# Patient Record
Sex: FEMALE | Race: WHITE
[De-identification: names, ages, dates, MRNs, and addresses within clinical notes are randomized per-mention and may not be internally consistent; named-entity substitution may affect disease eponyms.]

---

## 2018-12-27 ENCOUNTER — HOSPITAL ENCOUNTER (OUTPATIENT)
Dept: HOSPITAL 63 - US | Age: 58
Discharge: HOME | End: 2018-12-27
Attending: NURSE PRACTITIONER
Payer: COMMERCIAL

## 2018-12-27 DIAGNOSIS — N88.8: Primary | ICD-10-CM

## 2018-12-27 PROCEDURE — 76830 TRANSVAGINAL US NON-OB: CPT

## 2018-12-27 PROCEDURE — 76856 US EXAM PELVIC COMPLETE: CPT

## 2018-12-27 NOTE — RAD
US PELVIS W/TV:  12/27/2018 8:07 AM

 

INDICATION: 58 years old Female. Pelvic pain for months.

 

COMPARISON: None available.

 

TECHNIQUE: Transabdominal and transvaginal sonographic evaluation of the 

pelvis was performed.  

 

FINDINGS: 

 

UTERUS: Grayscale, color Doppler and spectral waveform analysis were 

utilized.

Size: 6.4 x 4.5 x 2.5 cm.

Masses: None. Nabothian cyst is identified measuring 5 mm.

Endometrium: 1-2 mm. Scant fluid is noted within the endometrium.

 

RIGHT OVARY: 1.5 x 1.5 x 1.2 cm. Unremarkable.

 

LEFT OVARY: 1.7 x 1.7 x 1.2 cm.  Unremarkable.

 

FREE FLUID: None.

 

URINARY BLADDER: Unremarkable.

 

 

IMPRESSION:

Endometrium is within normal limits with scant fluid identified. No 

suspicious vascularity.

 

Electronically signed by: Tena Love MD (12/27/2018 1:10 PM) 

[id number]

## 2019-09-27 ENCOUNTER — HOSPITAL ENCOUNTER (EMERGENCY)
Dept: HOSPITAL 63 - ER | Age: 59
Discharge: HOME | End: 2019-09-27
Payer: COMMERCIAL

## 2019-09-27 VITALS — DIASTOLIC BLOOD PRESSURE: 81 MMHG | SYSTOLIC BLOOD PRESSURE: 146 MMHG

## 2019-09-27 VITALS — BODY MASS INDEX: 20.89 KG/M2 | WEIGHT: 130 LBS | HEIGHT: 66 IN
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
## 2019-09-27 DIAGNOSIS — S60.211A: ICD-10-CM

## 2019-09-27 DIAGNOSIS — S60.212A: ICD-10-CM
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
## 2019-09-27 DIAGNOSIS — S52.502B: Primary | ICD-10-CM

## 2019-09-27 PROCEDURE — 73110 X-RAY EXAM OF WRIST: CPT

## 2019-09-27 PROCEDURE — 99284 EMERGENCY DEPT VISIT MOD MDM: CPT

## 2019-09-27 PROCEDURE — 90715 TDAP VACCINE 7 YRS/> IM: CPT

## 2019-09-27 PROCEDURE — 90471 IMMUNIZATION ADMIN: CPT

## 2019-09-27 PROCEDURE — 96372 THER/PROPH/DIAG INJ SC/IM: CPT

## 2019-09-27 NOTE — PHYS DOC
Past History


Past Medical History:  No Pertinent History


Past Surgical History:  Tonsillectomy


Alcohol Use:  None


Drug Use:  None





Adult General


Chief Complaint


Chief Complaint:  ANIMAL BITE





HPI


HPI


58-year-old female presents with multiple dog bites. The patient owns both of 

the dogs involved. They do not get along very well. Today they attacked each 

other and the patient didn't intervene. She was bitten multiple times by one or 

both animals. Her bilateral hands and wrists were involved. She has no injuries 

other than this area. She currently has swelling and pain in the left lateral 

wrist.  She denies any change in sensation. The patient's tetanus is likely out 

of date as she cannot remember when it was.





Review of Systems


Review of Systems





Constitutional: Denies fever or chills []


Eyes: Denies change in visual acuity, redness, or eye pain []


HENT: Denies nasal congestion or sore throat []


Respiratory: Denies cough or shortness of breath []


Cardiovascular: No additional information not addressed in HPI []


GI: Denies abdominal pain, nausea, vomiting, bloody stools or diarrhea []


: Denies dysuria or hematuria []


Musculoskeletal: Denies back pain or joint pain []


Integument: multiple dog bites of bilateral hands[]


Neurologic: Denies headache, focal weakness or sensory changes []


Endocrine: Denies polyuria or polydipsia []





All other systems were reviewed and found to be within normal limits, except as 

documented in this note.





Allergies


Allergies





Allergies








Coded Allergies Type Severity Reaction Last Updated Verified


 


  No Known Drug Allergies    9/27/19 No











Physical Exam


Physical Exam





Constitutional: Well developed, well nourished, no acute distress, non-toxic 

appearance. []


HENT: Normocephalic, atraumatic, bilateral external ears normal, oropharynx 

moist, no oral exudates, nose normal. []


Eyes: PERRLA, EOMI, conjunctiva normal, no discharge. [] 


Neck: Normal range of motion, no tenderness, supple, no stridor. [] 


Cardiovascular:Heart rate regular rhythm, no murmur []


Lungs & Thorax:  Bilateral breath sounds clear to auscultation []


Abdomen: Bowel sounds normal, soft, no tenderness, no masses, no pulsatile 

masses. [] 


Skin: Multiple dog bites of the bilateral hands and wrists with ecchymosis.[] 


Back: No tenderness, no CVA tenderness. [] 


Extremities: Tenderness and swelling of the left wrist, no obvious deformity. []

 


Neurologic: Alert and oriented X 3, normal motor function, normal sensory 

function, no focal deficits noted. []


Psychologic: Affect normal, judgement normal, mood normal. []





Current Patient Data


Vital Signs





                                   Vital Signs








  Date Time  Temp Pulse Resp B/P (MAP) Pulse Ox O2 Delivery O2 Flow Rate FiO2


 


9/27/19 14:58 97.8 101 20  97   











EKG


EKG


[]





Radiology/Procedures


Radiology/Procedures


[]


Impressions:


3 view study of the left wrist


 


Clinical indications: Dog bite and swelling.


 


FINDINGS: Soft tissue swelling is seen laterally and anteriorly. In the 


lateral view, there is a cortical fragment anterior to the distal left 


radial metaphysis. No radiolucent fracture line is seen elsewhere. 


Alignment is normal. No lytic process is evident. No radiopaque foreign 


body is seen.


 


IMPRESSION: Cortical fracture fragment of the anterior aspect of the 


distal left radius.


 


Electronically signed by: Renard Way MD (9/27/2019 3:33 PM) [id number]














DICTATED AND SIGNED BY:     RENARD WAY MD


DATE:     09/27/19 1533





CC: LISA COLLINS DO; SONIA THURSTON NP-C ~





Course & Med Decision Making


Course & Med Decision Making


Pertinent Labs and Imaging studies reviewed. (See chart for details)


We will give the patient her tetanus booster in the ED. I will also discharge 

her on Augmentin for 7 days for infection prophylaxis. The patient does have a 

fracture of the left distal radius. Given the puncture marks, I will consider 

this an open fracture. I will give her a gram of Rocephin in the ED in addition 

to Augmentin prescription. We will place her in a splint and advised that she 

follow up with orthopedics. We will not repair the wounds, but allow them to 

heal by secondary intention.


[]





Dragon Disclaimer


Dragon Disclaimer


This electronic medical record was generated, in whole or in part, using a voice

 recognition dictation system.





Departure


Departure:


Impression:  


   Primary Impression:  


   Dog bite, hand


   Additional Impressions:  


   Dog bite of left hand


   Fracture of left distal radius


Disposition:  01 HOME, SELF-CARE


Condition:  STABLE


Referrals:  


SONIA THURSTON NP-C (PCP)


Patient Instructions:  Animal Bite, Easy-to-Read





Additional Instructions:  


Please call the [place] [facility] for follow-up. Therefore number is 

[phone].


Scripts


Amoxicillin/Potassium Clav (AUGMENTIN 875-125 TABLET) 1 Each Tablet


1 TAB PO BID for dog bites, #14 TAB


   Prov: LISA COLLINS DO         9/27/19





Problem Qualifiers








   Primary Impression:  


   Dog bite, hand


   Encounter type:  initial encounter  Laterality:  right  Qualified Codes:  

   S61.451A - Open bite of right hand, initial encounter; W54.0XXA - Bitten by 

   dog, initial encounter


   Additional Impressions:  


   Dog bite of left hand


   Encounter type:  initial encounter  Qualified Codes:  S61.452A - Open bite of

    left hand, initial encounter; W54.0XXA - Bitten by dog, initial encounter


   Fracture of left distal radius


   Encounter type:  initial encounter  Fracture type:  open  Open fracture type:

     open type I or II  Fracture morphology:  unspecified fracture morphology  

   Qualified Codes:  S52.502B - Unspecified fracture of the lower end of left 

   radius, initial encounter for open fracture type I or II








LISA COLLINS DO                 Sep 27, 2019 15:28

## 2019-09-27 NOTE — RAD
3 view study of the left wrist

 

Clinical indications: Dog bite and swelling.

 

FINDINGS: Soft tissue swelling is seen laterally and anteriorly. In the 

lateral view, there is a cortical fragment anterior to the distal left 

radial metaphysis. No radiolucent fracture line is seen elsewhere. 

Alignment is normal. No lytic process is evident. No radiopaque foreign 

body is seen.

 

IMPRESSION: Cortical fracture fragment of the anterior aspect of the 

distal left radius.

 

Electronically signed by: Garland Way MD (9/27/2019 3:33 PM) [id number]

## 2021-10-22 ENCOUNTER — APPOINTMENT (RX ONLY)
Dept: URBAN - METROPOLITAN AREA CLINIC 76 | Facility: CLINIC | Age: 61
Setting detail: DERMATOLOGY
End: 2021-10-22

## 2021-10-22 DIAGNOSIS — L82.1 OTHER SEBORRHEIC KERATOSIS: ICD-10-CM

## 2021-10-22 DIAGNOSIS — L82.0 INFLAMED SEBORRHEIC KERATOSIS: ICD-10-CM

## 2021-10-22 PROBLEM — D48.5 NEOPLASM OF UNCERTAIN BEHAVIOR OF SKIN: Status: ACTIVE | Noted: 2021-10-22

## 2021-10-22 PROCEDURE — ? BIOPSY BY SHAVE METHOD

## 2021-10-22 PROCEDURE — 11102 TANGNTL BX SKIN SINGLE LES: CPT | Mod: 59

## 2021-10-22 PROCEDURE — 17110 DESTRUCTION B9 LES UP TO 14: CPT

## 2021-10-22 PROCEDURE — ? COUNSELING

## 2021-10-22 PROCEDURE — 99202 OFFICE O/P NEW SF 15 MIN: CPT | Mod: 25

## 2021-10-22 PROCEDURE — ? LIQUID NITROGEN

## 2021-10-22 ASSESSMENT — LOCATION ZONE DERM: LOCATION ZONE: NECK

## 2021-10-22 ASSESSMENT — LOCATION DETAILED DESCRIPTION DERM: LOCATION DETAILED: RIGHT CLAVICULAR NECK

## 2021-10-22 ASSESSMENT — LOCATION SIMPLE DESCRIPTION DERM: LOCATION SIMPLE: RIGHT ANTERIOR NECK

## 2021-10-22 NOTE — PROCEDURE: BIOPSY BY SHAVE METHOD
Hide Second Anesthesia?: No
Curettage Text: The wound bed was treated with curettage after the biopsy was performed.
Billing Type: Third-Party Bill
Size Of Lesion In Cm: 0.2
Hemostasis: Aluminum Chloride and Electrocautery
Anesthesia Volume In Cc: 0.5
Information: Selecting Yes will display possible errors in your note based on the variables you have selected. This validation is only offered as a suggestion for you. PLEASE NOTE THAT THE VALIDATION TEXT WILL BE REMOVED WHEN YOU FINALIZE YOUR NOTE. IF YOU WANT TO FAX A PRELIMINARY NOTE YOU WILL NEED TO TOGGLE THIS TO 'NO' IF YOU DO NOT WANT IT IN YOUR FAXED NOTE.
Cryotherapy Text: The wound bed was treated with cryotherapy after the biopsy was performed.
Biopsy Type: H and E
Depth Of Biopsy: dermis
Electrodesiccation Text: The wound bed was treated with electrodesiccation after the biopsy was performed.
Post-Care Instructions: I reviewed with the patient in detail post-care instructions. Patient is to keep the biopsy site dry overnight, and then apply bacitracin twice daily until healed. Patient may apply hydrogen peroxide soaks to remove any crusting.
Electrodesiccation And Curettage Text: The wound bed was treated with electrodesiccation and curettage after the biopsy was performed.
Notification Instructions: Patient will be notified of biopsy results. However, patient instructed to call the office if not contacted within 2 weeks.
Wound Care: Antibiotic ointment
Additional Anesthesia Volume In Cc (Will Not Render If 0): 0
Silver Nitrate Text: The wound bed was treated with silver nitrate after the biopsy was performed.
Body Location Override (Optional - Billing Will Still Be Based On Selected Body Map Location If Applicable): right nasofacial sulcus
Type Of Destruction Used: Curettage
Biopsy Method: Personna blade
Was A Bandage Applied: Yes
Consent: Written consent was obtained and risks were reviewed including but not limited to scarring, infection, bleeding, scabbing, incomplete removal, nerve damage and allergy to anesthesia.
Anesthesia Type: 1% lidocaine with 1:100,000 epinephrine
Dressing: bandage
Detail Level: Detailed

## 2021-10-22 NOTE — PROCEDURE: LIQUID NITROGEN
Show Applicator Variable?: Yes
Include Z78.9 (Other Specified Conditions Influencing Health Status) As An Associated Diagnosis?: No
Consent: The patient's consent was obtained including but not limited to risks of crusting, scabbing, blistering, scarring, darker or lighter pigmentary change, recurrence, incomplete removal and infection.
Post-Care Instructions: I reviewed with the patient in detail post-care instructions. Patient is to wear sunprotection, and avoid picking at any of the treated lesions. Pt may apply Vaseline to crusted or scabbing areas.
Duration Of Freeze Thaw-Cycle (Seconds): 15
Number Of Freeze-Thaw Cycles: 2 freeze-thaw cycles
Medical Necessity Clause: This procedure was medically necessary because the lesions that were treated were:
Medical Necessity Information: It is in your best interest to select a reason for this procedure from the list below. All of these items fulfill various CMS LCD requirements except the new and changing color options.
Detail Level: Detailed